# Patient Record
Sex: MALE | Race: WHITE | URBAN - METROPOLITAN AREA
[De-identification: names, ages, dates, MRNs, and addresses within clinical notes are randomized per-mention and may not be internally consistent; named-entity substitution may affect disease eponyms.]

---

## 2017-05-01 ENCOUNTER — OFFICE VISIT (OUTPATIENT)
Dept: FAMILY MEDICINE CLINIC | Facility: CLINIC | Age: 37
End: 2017-05-01

## 2017-05-01 VITALS
HEART RATE: 76 BPM | TEMPERATURE: 98 F | DIASTOLIC BLOOD PRESSURE: 80 MMHG | OXYGEN SATURATION: 99 % | RESPIRATION RATE: 16 BRPM | SYSTOLIC BLOOD PRESSURE: 128 MMHG

## 2017-05-01 DIAGNOSIS — L03.213 PRESEPTAL CELLULITIS OF LEFT UPPER EYELID: Primary | ICD-10-CM

## 2017-05-01 PROCEDURE — 99203 OFFICE O/P NEW LOW 30 MIN: CPT | Performed by: NURSE PRACTITIONER

## 2017-05-01 RX ORDER — CLINDAMYCIN HYDROCHLORIDE 300 MG/1
300 CAPSULE ORAL 3 TIMES DAILY
Qty: 21 CAPSULE | Refills: 0 | Status: SHIPPED | OUTPATIENT
Start: 2017-05-01 | End: 2017-05-08

## 2017-05-01 NOTE — PATIENT INSTRUCTIONS
Take antibiotics as prescribed. Consider taking with probiotic such as Florastor. Continue warm compresses.   Be re-evaluated if worsening or not showing signs of improvement in 48 hours    Periorbital Cellulitis  Periorbital cellulitis is an infection of Take this medicine by mouth with a full glass of water. Follow the directions on the prescription label. You can take this medicine with food or on an empty stomach. If the medicine upsets your stomach, take it with food.  Take your medicine at regular inte · an unusual or allergic reaction to clindamycin, lincomycin, or other medicines, foods, dyes like tartrazine or preservatives  · pregnant or trying to get pregnant  · breast-feeding  What should I watch for while using this medicine?   Tell your doctor or · Stomach pain, nausea, loss of appetite, or fever, particularly if you have persistent diarrhea  What other tests might I have along with this test?  Your healthcare provider might order other stool tests to look for C. difficile infection, such as a glut This test poses no known risks. What might affect my test results? Contaminating the stool sample with toilet water, urine, or other substances can make it unfit for testing or skew the results.   How do I get ready for this test?  You don't need to prepa

## 2017-05-01 NOTE — PROGRESS NOTES
CHIEF COMPLAINT:   Patient presents with:  Eye Pain      HPI:   Mahamed Coelho is a 39year old male who presents with chief complaint of redness and swelling to left upper eyelid, worsening over 4 days. There was no trauma. No severe pain.  No visio LUNGS: clear to auscultation bilaterally, nonlabored   CARDIO: RRR without murmur  LYMPH: no preauricular lymphadenopathy. No cervical lymphadenopathy    ASSESSMENT AND PLAN:   Mary Reyes is a 39year old male who presents with:    1.  Preseptal · You may use over-the-counter medicine as directed based on age and weight to help with pain and fever, unless another pain medicine was given.  If you have liver disease or ever had a stomach ulcer, talk with your healthcare provider before using these me Side effects that you should report to your doctor or health care professional as soon as possible:  · allergic reactions like skin rash, itching or hives, swelling of the face, lips, or tongue  · dark urine  · pain on swallowing  · redness, blistering, pe NOTE:This sheet is a summary. It may not cover all possible information. If you have questions about this medicine, talk to your doctor, pharmacist, or health care provider.  Copyright© 2016 [employer]        Clostridium Difficile Toxin (Stool)  Does thi If your stool tests are negative, but your doctor still strongly suspects C. difficile infection, you may have a sigmoidoscopy or colonoscopy to help make a diagnosis.  In this procedure, a doctor examines your colon with a very thin, flexible lighted tube © 4457-7151 [address], [address] [place]. All rights reserved. This information is not intended as a substitute for professional medical care. Always follow your healthcare professional's instructions.

## 2017-08-25 ENCOUNTER — OFFICE VISIT (OUTPATIENT)
Dept: FAMILY MEDICINE CLINIC | Facility: CLINIC | Age: 37
End: 2017-08-25

## 2017-08-25 VITALS
HEIGHT: 69 IN | DIASTOLIC BLOOD PRESSURE: 80 MMHG | TEMPERATURE: 98 F | HEART RATE: 88 BPM | OXYGEN SATURATION: 98 % | RESPIRATION RATE: 14 BRPM | SYSTOLIC BLOOD PRESSURE: 128 MMHG | WEIGHT: 315 LBS | BODY MASS INDEX: 46.65 KG/M2

## 2017-08-25 DIAGNOSIS — IMO0001 CLASS 3 OBESITY DUE TO EXCESS CALORIES WITHOUT SERIOUS COMORBIDITY WITH BODY MASS INDEX (BMI) OF 45.0 TO 49.9 IN ADULT: ICD-10-CM

## 2017-08-25 DIAGNOSIS — Z00.00 GENERAL MEDICAL EXAMINATION: Primary | ICD-10-CM

## 2017-08-25 DIAGNOSIS — Z23 NEED FOR VACCINATION: ICD-10-CM

## 2017-08-25 PROCEDURE — 90471 IMMUNIZATION ADMIN: CPT | Performed by: NURSE PRACTITIONER

## 2017-08-25 PROCEDURE — 90715 TDAP VACCINE 7 YRS/> IM: CPT | Performed by: NURSE PRACTITIONER

## 2017-08-25 PROCEDURE — 99395 PREV VISIT EST AGE 18-39: CPT | Performed by: NURSE PRACTITIONER

## 2017-08-25 NOTE — PATIENT INSTRUCTIONS
Nonurgent Medical Screening Exam  You have had a medical screening exam. The results show that you don’t have a condition that needs to be treated in the emergency department.   You can safely wait until you can see your healthcare provider for evaluation Adult Immunization Schedule  Vaccine How Often Disease Prevented Who Needs It   Influenza Every year Flu. This can be especially dangerous to elderly adults or people with immune disorders. All adults.    Tetanus, diphtheria (Td); or Tetanus, diphtheria, an Measles, mumps, rubella (MMR)** 1 or 2 doses, for ages 23 through 52; 1 dose for ages 48 and older if at risk Measles, a disease marked by red spots, fever, and coughing  Mumps, a disease that causes swelling in the salivary glands.  It may affect the ovari Hepatitis A (HepA) One series of 2 injections Hepatitis A. This is an infection that can result in acute liver inflammation and yellow skin and whites of the eyes (jaundice).  Adults with risk factors, such as clotting disorders or chronic liver disease, an Diabetes mellitus, type 2 Adults who have no symptoms but are overweight or obese and have 1 or more other risk factors for diabetes At least every 3 years   Hepatitis C If at increased risk At routine exams   High cholesterol or triglycerides All men ages Meningococcal Men at increased risk for infection – talk with your healthcare provider 1 or more doses   Pneumococca (PCV13) and Pneumococcal (PPSV23) Men at increased risk for infection – talk with your healthcare provider PCV13: 1 dose ages 23 to 72 (pro

## 2017-08-25 NOTE — PROGRESS NOTES
CHIEF COMPLAINT:   Patient presents with:  Employment Physical      HPI:   Malrene Yun is a 39year old male who presents for a physical exam for work physical.   Patient has no health concerns. Has no PCP at the moment.   Is currently movi lesions  HEENT: atraumatic, normocephalic,ears and throat are clear  EYES:PERRLA, EOMI, conjunctivae are clear  NECK: supple,no lymphadenopathy. No thyromegaly or thyroid nodules palpated.   CHEST: no chest tenderness to palpation  LUNGS: Clear to ausculta

## 2018-08-31 ENCOUNTER — OFFICE VISIT (OUTPATIENT)
Dept: FAMILY MEDICINE CLINIC | Facility: CLINIC | Age: 38
End: 2018-08-31
Payer: COMMERCIAL

## 2018-08-31 VITALS
TEMPERATURE: 99 F | SYSTOLIC BLOOD PRESSURE: 150 MMHG | HEART RATE: 90 BPM | DIASTOLIC BLOOD PRESSURE: 94 MMHG | RESPIRATION RATE: 16 BRPM | BODY MASS INDEX: 46.65 KG/M2 | WEIGHT: 315 LBS | OXYGEN SATURATION: 96 % | HEIGHT: 68.75 IN

## 2018-08-31 DIAGNOSIS — Z02.89 PHYSICAL EXAMINATION OF EMPLOYEE: Primary | ICD-10-CM

## 2018-08-31 DIAGNOSIS — E66.01 CLASS 3 SEVERE OBESITY DUE TO EXCESS CALORIES WITHOUT SERIOUS COMORBIDITY WITH BODY MASS INDEX (BMI) OF 45.0 TO 49.9 IN ADULT (HCC): ICD-10-CM

## 2018-08-31 DIAGNOSIS — R03.0 ELEVATED BLOOD PRESSURE READING WITHOUT DIAGNOSIS OF HYPERTENSION: ICD-10-CM

## 2018-08-31 PROCEDURE — 99395 PREV VISIT EST AGE 18-39: CPT | Performed by: NURSE PRACTITIONER

## 2018-08-31 NOTE — PROGRESS NOTES
CHIEF COMPLAINT:   Patient presents with:  Employment Physical      HPI:   Jacey Knox is a 44year old male who presents for a physical exam for work. Patient has nods health concerns. Feels well today.   Plans on engagement with girlfrien 322 lb   SpO2 96%   BMI 47.90 kg/m²   Body mass index is 47.9 kg/m².      GENERAL: well developed, well nourished,in no apparent distress  SKIN: no rashes,no suspicious lesions  HEENT: atraumatic, normocephalic,ears and throat are clear  EYES:PERRLA, EOMI,

## 2018-08-31 NOTE — PATIENT INSTRUCTIONS
Understanding Body Mass Index (BMI)  Body mass index (BMI) is a method of screening for a weight category using the ratio of your height to your weight.  The BMI is a measure of overweight that is corrected for height. Knowing your BMI is a way to tell if Cholesterol is a fat-like substance in your blood. It can build up along the artery walls. This is called plaque. Over time, plaque narrows the arteries and reduces blood flow to your heart or brain.  If a blood clot forms or a piece of the plaque breaks of High blood pressure occurs when blood pushes too hard against artery walls. This causes damage to the artery walls and the formation of scar tissue as it heals. This makes the arteries stiff and weak.  Plaque sticks to the scarred tissue narrowing and harde © 7947-2908 [address] [facility], [address] [place]. All rights reserved. This information is not intended as a substitute for professional medical care. Always follow your healthcare professional's instructions.         Weight · If you find yourself eating when you’re not hungry, ask yourself why. Many of us eat when we’re bored, stressed, or just to be polite. Listen to your body. If you’re not hungry, get busy doing something else instead of eating.   · Eat slower, shooting for · To lose weight, try to reduce your total calorie intake by 500 calories. To do this, eat 250 calories less each day. Add activity to burn the other 250 calories. Walking 2.5 miles burns about 250 calories.  Other more intense activities can burn more bj Screening tests and vaccines are an important part of managing your health. A screening test is done to find possible disorders or diseases in people who don't have any symptoms.  The goal is to find a disease early so lifestyle changes can be made and you Chickenpox (varicella) All men in this age group who have no record of this infection or vaccine 2 doses; the second dose should be given at least 4 weeks after the first dose   Hepatitis A Men at increased risk for infection – talk with your healthcare pr Sexually transmitted infection prevention Men who are sexually active At routine exams   Skin cancer Prevention of skin cancer in fair-skinned adults through age 25 At routine exams   1Those who are 25years of age, who are not up-to-date on their childhoo

## (undated) NOTE — MR AVS SNAPSHOT
[id number] [facility] [address]  [place] [id number] [phone]               Thank you for choosing us for your health care visit with HOWIE Prater.   We are glad to serve you and happy to provide you with this summary of your vis · Fever of 100.4 (38º C) oral or 101.5 (38.6º C) rectal for more than 2 days on antibiotics  Date Last Reviewed: 6/1/2016  © 7686-3047 [address], [address] [place] [address]. All rights reserved.  This information is not intend · chloramphenicol  · erythromycin  · kaolin products  What if I miss a dose? If you miss a dose, take it as soon as you can. If it is almost time for your next dose, take only that dose. Do not take double or extra doses. Where should I keep my medicine? tract, inflaming the colon and causing continuing diarrhea. The resulting illness can be serious, even life-threatening, if you have a weak immune system.   Even if they are not taking antibiotics, people with a weak immune system often have an overabundanc the result is negative. If your healthcare provider still suspects infection, he or she may do other tests.   If your stool tests positive for C. difficile toxins, your doctor may decide that you have antibiotic-associated colitis, or inflammation of the co information, go to [email or web address] and click on the Sign Up Now link in the [employer] box. Enter your [facility] Activation Code exactly as it appears below along with your Zip Code and Date of Birth to complete the sign-up process.  If you do You don’t need to join a gym. Home exercises work great.  Put more priority on exercise in your life                    Visit [facility] online at  [email or web address]